# Patient Record
Sex: MALE | Race: WHITE | NOT HISPANIC OR LATINO | Employment: FULL TIME | URBAN - NONMETROPOLITAN AREA
[De-identification: names, ages, dates, MRNs, and addresses within clinical notes are randomized per-mention and may not be internally consistent; named-entity substitution may affect disease eponyms.]

---

## 2020-04-02 ENCOUNTER — OFFICE VISIT (OUTPATIENT)
Dept: FAMILY MEDICINE | Facility: OTHER | Age: 35
End: 2020-04-02
Attending: FAMILY MEDICINE

## 2020-04-02 VITALS
HEART RATE: 100 BPM | WEIGHT: 196.2 LBS | BODY MASS INDEX: 26 KG/M2 | OXYGEN SATURATION: 97 % | SYSTOLIC BLOOD PRESSURE: 126 MMHG | DIASTOLIC BLOOD PRESSURE: 80 MMHG | RESPIRATION RATE: 16 BRPM | TEMPERATURE: 98.1 F | HEIGHT: 73 IN

## 2020-04-02 DIAGNOSIS — B37.9 CANDIDA INFECTION: ICD-10-CM

## 2020-04-02 DIAGNOSIS — L23.9 ALLERGIC CONTACT DERMATITIS, UNSPECIFIED TRIGGER: Primary | ICD-10-CM

## 2020-04-02 PROBLEM — E66.3 OVERWEIGHT (BMI 25.0-29.9): Status: ACTIVE | Noted: 2018-08-29

## 2020-04-02 PROBLEM — Z72.0 TOBACCO ABUSE: Status: ACTIVE | Noted: 2018-08-29

## 2020-04-02 PROCEDURE — 99202 OFFICE O/P NEW SF 15 MIN: CPT | Performed by: FAMILY MEDICINE

## 2020-04-02 RX ORDER — TRIAMCINOLONE ACETONIDE 5 MG/G
OINTMENT TOPICAL
Qty: 45 G | Refills: 3 | Status: SHIPPED | OUTPATIENT
Start: 2020-04-02

## 2020-04-02 RX ORDER — KETOCONAZOLE 20 MG/G
CREAM TOPICAL 2 TIMES DAILY
Qty: 30 G | Refills: 3 | Status: SHIPPED | OUTPATIENT
Start: 2020-04-02

## 2020-04-02 RX ORDER — KETOCONAZOLE 20 MG/G
CREAM TOPICAL
COMMUNITY
Start: 2020-03-23 | End: 2020-04-02

## 2020-04-02 RX ORDER — PREDNISONE 10 MG/1
10 TABLET ORAL 2 TIMES DAILY
Qty: 14 TABLET | Refills: 0 | Status: SHIPPED | OUTPATIENT
Start: 2020-04-02

## 2020-04-02 ASSESSMENT — PAIN SCALES - GENERAL: PAINLEVEL: NO PAIN (0)

## 2020-04-02 ASSESSMENT — MIFFLIN-ST. JEOR: SCORE: 1870.9

## 2020-04-02 NOTE — NURSING NOTE
"Chief Complaint   Patient presents with     Rectal Problem     yeast infection     Has been doctoring over the phone at [facility] they put him on Diflucan and Nizoral.  Increased yellow discharge x 3 weeks.      Initial /80   Pulse 100   Temp 98.1  F (36.7  C) (Temporal)   Resp 16   Ht 1.842 m (6' 0.5\")   Wt 89 kg (196 lb 3.2 oz)   SpO2 97%   BMI 26.24 kg/m   Estimated body mass index is 26.24 kg/m  as calculated from the following:    Height as of this encounter: 1.842 m (6' 0.5\").    Weight as of this encounter: 89 kg (196 lb 3.2 oz).    Medication Reconciliation: complete      Norma J. Gosselin, LPN  "

## 2020-04-02 NOTE — PROGRESS NOTES
"Nursing Notes:   Gosselin, Norma J., LPN  4/2/2020  4:54 PM  Sign at exiting of workspace  Chief Complaint   Patient presents with     Rectal Problem     yeast infection     Has been doctoring over the phone at [facility] they put him on Diflucan and Nizoral.  Increased yellow discharge x 3 weeks.      Initial /80   Pulse 100   Temp 98.1  F (36.7  C) (Temporal)   Resp 16   Ht 1.842 m (6' 0.5\")   Wt 89 kg (196 lb 3.2 oz)   SpO2 97%   BMI 26.24 kg/m   Estimated body mass index is 26.24 kg/m  as calculated from the following:    Height as of this encounter: 1.842 m (6' 0.5\").    Weight as of this encounter: 89 kg (196 lb 3.2 oz).    Medication Reconciliation: complete      Norma J. Gosselin, LPN    SUBJECTIVE:   Ritchie Altman is a 34 year old male who has had about 3 weeks of itching in perineal to rectal area. Tried vaseline and cortisone and it helped. Then noted a rash that radiated to skin of \"butt cheeks\". Then also tried a jock itch spray.  Noted the area felt \"damp\" and rectal area had a yellowish discharge. No change in bowel movements. Last week called his clinic and they thought yeast and given nizoral ointment and oral diflucan, then several days later called again and repeated diflucan on 3/26/2020. Last 2 days has had continued \"fluid secretions\" and notes an odor and has place toilet paper in the area to absorb it.  He also then relates using a preparation H product in addition to those prescribed and used last night.     OBJECTIVE:   Vital signs:  Temp: 98.1  F (36.7  C) Temp src: Temporal BP: 126/80 Pulse: 100   Resp: 16 SpO2: 97 %     Height: 184.2 cm (6' 0.5\") Weight: 89 kg (196 lb 3.2 oz)  Estimated body mass index is 26.24 kg/m  as calculated from the following:    Height as of this encounter: 1.842 m (6' 0.5\").    Weight as of this encounter: 89 kg (196 lb 3.2 oz).  Appears well, alert, oriented, pleasant and cooperative.   Area of perineum and extending out from rectum at least 6 cm is a " confluent papulo vesicular rash that looks like a contact dermatitis. May have initially had yeast but now reacting to something applied.       ASSESSMENT:  1. Allergic contact dermatitis, unspecified trigger    2. Candida infection          PLAN:   STOP all over the counter topicals.  Sitz baths, avoid soap. Use panti liner to absorb secretions and keep dry.  Switch to mixture of steroid ointment with the ketaconazole as discussed. He is to mix 1:1 and apply.   Short burst of oral steroid.  Follow up if not improving and this will slowly fade and clear.  Michelle Ulloa MD  5:23 PM 4/2/2020   \

## 2020-05-14 ENCOUNTER — HOSPITAL ENCOUNTER (EMERGENCY)
Facility: OTHER | Age: 35
Discharge: HOME | End: 2020-05-14
Admitting: FAMILY MEDICINE
Payer: COMMERCIAL

## 2020-05-14 VITALS
RESPIRATION RATE: 18 BRPM | HEART RATE: 99 BPM | SYSTOLIC BLOOD PRESSURE: 143 MMHG | TEMPERATURE: 97.1 F | DIASTOLIC BLOOD PRESSURE: 73 MMHG | BODY MASS INDEX: 26.22 KG/M2 | WEIGHT: 196 LBS

## 2020-05-14 DIAGNOSIS — A49.01 INFECTION DUE TO STAPHYLOCOCCUS AUREUS: ICD-10-CM

## 2020-05-14 LAB
ALBUMIN UR-MCNC: NEGATIVE MG/DL
APPEARANCE UR: CLEAR
BILIRUB UR QL STRIP: NEGATIVE
COLOR UR AUTO: ABNORMAL
GLUCOSE UR STRIP-MCNC: NEGATIVE MG/DL
HGB UR QL STRIP: NEGATIVE
KETONES UR STRIP-MCNC: NEGATIVE MG/DL
LEUKOCYTE ESTERASE UR QL STRIP: NEGATIVE
NITRATE UR QL: NEGATIVE
PH UR STRIP: 7.5 PH (ref 5–7)
SOURCE: ABNORMAL
SP GR UR STRIP: 1.02 (ref 1–1.03)
UROBILINOGEN UR STRIP-MCNC: NORMAL MG/DL (ref 0–2)

## 2020-05-14 PROCEDURE — 25000132 ZZH RX MED GY IP 250 OP 250 PS 637: Performed by: FAMILY MEDICINE

## 2020-05-14 PROCEDURE — 99283 EMERGENCY DEPT VISIT LOW MDM: CPT | Performed by: FAMILY MEDICINE

## 2020-05-14 PROCEDURE — 81003 URINALYSIS AUTO W/O SCOPE: CPT | Performed by: FAMILY MEDICINE

## 2020-05-14 PROCEDURE — 99283 EMERGENCY DEPT VISIT LOW MDM: CPT | Mod: Z6 | Performed by: FAMILY MEDICINE

## 2020-05-14 RX ORDER — SULFAMETHOXAZOLE/TRIMETHOPRIM 800-160 MG
1 TABLET ORAL ONCE
Status: COMPLETED | OUTPATIENT
Start: 2020-05-14 | End: 2020-05-14

## 2020-05-14 RX ORDER — SULFAMETHOXAZOLE/TRIMETHOPRIM 800-160 MG
1 TABLET ORAL 2 TIMES DAILY
Qty: 20 TABLET | Refills: 0 | Status: SHIPPED | OUTPATIENT
Start: 2020-05-14 | End: 2020-05-24

## 2020-05-14 RX ADMIN — SULFAMETHOXAZOLE AND TRIMETHOPRIM 1 TABLET: 800; 160 TABLET ORAL at 05:24

## 2020-05-14 NOTE — ED AVS SNAPSHOT
[facility]  [address]  [place] [address]  Phone:  [phone]  Fax:  [phone]                                    Ritchie Altman   MRN: [id number]    Department:  [facility]   Date of Visit:  5/14/2020           After Visit Summary Signature Page    I have received my discharge instructions, and my questions have been answered. I have discussed any challenges I see with this plan with the nurse or doctor.    ..........................................................................................................................................  Patient/Patient Representative Signature      ..........................................................................................................................................  Patient Representative Print Name and Relationship to Patient    ..................................................               ................................................  Date                                   Time    ..........................................................................................................................................  Reviewed by Signature/Title    ...................................................              ..............................................  Date                                               Time          22EPIC Rev 08/18

## 2020-05-14 NOTE — ED TRIAGE NOTES
"Patient presents to the ED with complaints of abdominal pain, fatigue, and a reoccuring yeast infection.  Patient states for 1.5 months he has been having bouts of yeast infections which will flare up and patient has symptoms relief for 2 weeks after which it appears again.  Patient states [facility] and [facility].  Patient states yeast has appeared in groin, belly button, and buttocks.  Came back last Monday and is now causing bloating and gas.  Patient states while he was at work tonight he felt like he started urinating more and \"feels dehydrated because I started feeling tingly from head to toe\".  Patient also states, \"I dont know if its from the dehydration but im feeling really tired\".  Patient states no problem with eating or drinking.  Patient also states he has incorporated yogurt into his diet as he has \"been reading up on yeast infections and read that yogurt has good bacteria so I was hoping that would help\".     "

## 2020-05-14 NOTE — ED PROVIDER NOTES
History     Chief Complaint   Patient presents with     Abdominal Pain     Dysuria     HPI  Ritchie Altman is a 34 year old male who presents with concern related to recurrent yeast infection Patient was trying various otc remedies and then developed contact dermatitis. Was doctoring via virtual visits.  INitially treated with diflucan , and topical ketoconazole  Then seen for allergic dermatits at which time was treated with 7 days of prednisone . . Rash initially in rectal area and then spread to umbilicus and now in rectal area again  He thinks there is also a foul odor. It is somewhat itchy . He is also having some lower abdominal bloating and discomfort . NO fever , chills.     Allergies:  Allergies   Allergen Reactions     Penicillins Rash     Other reaction(s): *Unknown  As a child         Problem List:    Patient Active Problem List    Diagnosis Date Noted     Overweight (BMI 25.0-29.9) 08/29/2018     Priority: Medium     Tobacco abuse 08/29/2018     Priority: Medium        Past Medical History:    Past Medical History:   Diagnosis Date     General medical examination      Hemorrhage of rectum and anus      Major depressive disorder, single episode        Past Surgical History:    History reviewed. No pertinent surgical history.    Family History:    Family History   Problem Relation Age of Onset     Genetic Disorder Other         Genetic,General hx:  Migraines, ulcer disease, depression, fibromyalgia       Social History:  Marital Status:  Single [1]  Social History     Tobacco Use     Smoking status: Current Every Day Smoker     Packs/day: 0.50     Years: 18.00     Pack years: 9.00     Types: Cigarettes     Smokeless tobacco: Never Used   Substance Use Topics     Alcohol use: Yes     Comment: rare     Drug use: Never        Medications:    ketoconazole (NIZORAL) 2 % external cream  predniSONE (DELTASONE) 10 MG tablet  triamcinolone (KENALOG) 0.5 % external ointment          Review of Systems    Constitutional: Negative.    HENT: Negative.    Cardiovascular: Negative.    Gastrointestinal: Positive for abdominal distention and abdominal pain. Negative for blood in stool, constipation, diarrhea, nausea, rectal pain and vomiting.   Genitourinary: Positive for dysuria. Negative for discharge, genital sores, hematuria, penile pain, penile swelling, scrotal swelling, testicular pain and urgency.   Skin: Positive for rash.   Neurological: Negative.    Psychiatric/Behavioral: Negative.        Physical Exam   BP: (!) 143/73  Pulse: 99  Temp: 97.1  F (36.2  C)  Resp: 18  Weight: 88.9 kg (196 lb)      Physical Exam  Vitals signs and nursing note reviewed.   Constitutional:       Appearance: He is well-developed.   HENT:      Head: Normocephalic and atraumatic.   Abdominal:      General: Abdomen is flat. There is no distension.      Palpations: Abdomen is soft.   Skin:     Comments: Follicular rash perirectal and scattered down bilateral thighs with surrounding erythema consistent with staph infection    Neurological:      Mental Status: He is alert.         ED Course        Procedures          Patient presents to ER for evaluation of recurrent rash for the last month . Patient triaged to exam room . Vital signs reviewed. Medical records reviewed. History and exam completed. Appearance of rash most consistent with staph folliculitis. Discussed diagnosis and management with patient . Will  Treat with bactrim . Follow up with primary care if rash is not starting to improve in the next several days or if rash worsens or if he should develop other symptoms such as fever, chills or other systemic symptoms          Results for orders placed or performed during the hospital encounter of 05/14/20 (from the past 24 hour(s))   UA reflex to Microscopic and Culture    Specimen: Urine clean catch; Midstream Urine   Result Value Ref Range    Color Urine Light Yellow     Appearance Urine Clear     Glucose Urine Negative  NEG^Negative mg/dL    Bilirubin Urine Negative NEG^Negative    Ketones Urine Negative NEG^Negative mg/dL    Specific Gravity Urine 1.018 1.003 - 1.035    Blood Urine Negative NEG^Negative    pH Urine 7.5 (H) 5.0 - 7.0 pH    Protein Albumin Urine Negative NEG^Negative mg/dL    Urobilinogen mg/dL Normal 0.0 - 2.0 mg/dL    Nitrite Urine Negative NEG^Negative    Leukocyte Esterase Urine Negative NEG^Negative    Source Midstream Urine        Medications - No data to display    Assessments & Plan (with Medical Decision Making)     I have reviewed the nursing notes.    I have reviewed the findings, diagnosis, plan and need for follow up with the patient.      New Prescriptions    No medications on file       Final diagnoses:   Infection due to Staphylococcus aureus       5/14/2020   [facility]arisa Doyle MD  05/17/20 0902

## 2020-05-17 ASSESSMENT — ENCOUNTER SYMPTOMS
NAUSEA: 0
VOMITING: 0
RECTAL PAIN: 0
PSYCHIATRIC NEGATIVE: 1
NEUROLOGICAL NEGATIVE: 1
HEMATURIA: 0
CONSTIPATION: 0
BLOOD IN STOOL: 0
CONSTITUTIONAL NEGATIVE: 1
DIARRHEA: 0
ABDOMINAL PAIN: 1
DYSURIA: 1
CARDIOVASCULAR NEGATIVE: 1
ABDOMINAL DISTENTION: 1

## 2020-05-21 ENCOUNTER — HOSPITAL ENCOUNTER (EMERGENCY)
Facility: OTHER | Age: 35
Discharge: HOME | End: 2020-05-21
Attending: PHYSICIAN ASSISTANT | Admitting: PHYSICIAN ASSISTANT
Payer: COMMERCIAL

## 2020-05-21 ENCOUNTER — APPOINTMENT (OUTPATIENT)
Dept: CT IMAGING | Facility: OTHER | Age: 35
End: 2020-05-21
Attending: PHYSICIAN ASSISTANT
Payer: COMMERCIAL

## 2020-05-21 VITALS
OXYGEN SATURATION: 99 % | DIASTOLIC BLOOD PRESSURE: 61 MMHG | BODY MASS INDEX: 25.41 KG/M2 | WEIGHT: 190 LBS | SYSTOLIC BLOOD PRESSURE: 113 MMHG | HEART RATE: 70 BPM | RESPIRATION RATE: 14 BRPM | TEMPERATURE: 97.4 F

## 2020-05-21 DIAGNOSIS — R10.84 ABDOMINAL PAIN, GENERALIZED: ICD-10-CM

## 2020-05-21 LAB
ALBUMIN SERPL-MCNC: 4.7 G/DL (ref 3.5–5.7)
ALBUMIN UR-MCNC: NEGATIVE MG/DL
ALP SERPL-CCNC: 54 U/L (ref 34–104)
ALT SERPL W P-5'-P-CCNC: 24 U/L (ref 7–52)
ANION GAP SERPL CALCULATED.3IONS-SCNC: 9 MMOL/L (ref 3–14)
APPEARANCE UR: CLEAR
AST SERPL W P-5'-P-CCNC: 30 U/L (ref 13–39)
BASOPHILS # BLD AUTO: 0.1 10E9/L (ref 0–0.2)
BASOPHILS NFR BLD AUTO: 1.2 %
BILIRUB SERPL-MCNC: 0.9 MG/DL (ref 0.3–1)
BILIRUB UR QL STRIP: NEGATIVE
BUN SERPL-MCNC: 16 MG/DL (ref 7–25)
CALCIUM SERPL-MCNC: 9.5 MG/DL (ref 8.6–10.3)
CHLORIDE SERPL-SCNC: 103 MMOL/L (ref 98–107)
CO2 SERPL-SCNC: 26 MMOL/L (ref 21–31)
COLOR UR AUTO: YELLOW
CREAT SERPL-MCNC: 1.07 MG/DL (ref 0.7–1.3)
DIFFERENTIAL METHOD BLD: NORMAL
EOSINOPHIL # BLD AUTO: 0 10E9/L (ref 0–0.7)
EOSINOPHIL NFR BLD AUTO: 0.7 %
ERYTHROCYTE [DISTWIDTH] IN BLOOD BY AUTOMATED COUNT: 11.8 % (ref 10–15)
GFR SERPL CREATININE-BSD FRML MDRD: 79 ML/MIN/{1.73_M2}
GLUCOSE SERPL-MCNC: 97 MG/DL (ref 70–105)
GLUCOSE UR STRIP-MCNC: NEGATIVE MG/DL
HCT VFR BLD AUTO: 44.2 % (ref 40–53)
HGB BLD-MCNC: 15 G/DL (ref 13.3–17.7)
HGB UR QL STRIP: NEGATIVE
IMM GRANULOCYTES # BLD: 0 10E9/L (ref 0–0.4)
IMM GRANULOCYTES NFR BLD: 0.2 %
KETONES UR STRIP-MCNC: NEGATIVE MG/DL
LEUKOCYTE ESTERASE UR QL STRIP: NEGATIVE
LIPASE SERPL-CCNC: 25 U/L (ref 11–82)
LYMPHOCYTES # BLD AUTO: 0.8 10E9/L (ref 0.8–5.3)
LYMPHOCYTES NFR BLD AUTO: 18.1 %
MCH RBC QN AUTO: 31.3 PG (ref 26.5–33)
MCHC RBC AUTO-ENTMCNC: 33.9 G/DL (ref 31.5–36.5)
MCV RBC AUTO: 92 FL (ref 78–100)
MONOCYTES # BLD AUTO: 0.8 10E9/L (ref 0–1.3)
MONOCYTES NFR BLD AUTO: 18.1 %
NEUTROPHILS # BLD AUTO: 2.7 10E9/L (ref 1.6–8.3)
NEUTROPHILS NFR BLD AUTO: 61.7 %
NITRATE UR QL: NEGATIVE
PH UR STRIP: 5.5 PH (ref 5–7)
PLATELET # BLD AUTO: 275 10E9/L (ref 150–450)
POTASSIUM SERPL-SCNC: 3.8 MMOL/L (ref 3.5–5.1)
PROT SERPL-MCNC: 7.5 G/DL (ref 6.4–8.9)
RBC # BLD AUTO: 4.79 10E12/L (ref 4.4–5.9)
SODIUM SERPL-SCNC: 138 MMOL/L (ref 134–144)
SOURCE: NORMAL
SP GR UR STRIP: 1.01 (ref 1–1.03)
UROBILINOGEN UR STRIP-MCNC: NORMAL MG/DL (ref 0–2)
WBC # BLD AUTO: 4.3 10E9/L (ref 4–11)

## 2020-05-21 PROCEDURE — 80053 COMPREHEN METABOLIC PANEL: CPT | Performed by: PHYSICIAN ASSISTANT

## 2020-05-21 PROCEDURE — 83690 ASSAY OF LIPASE: CPT | Performed by: PHYSICIAN ASSISTANT

## 2020-05-21 PROCEDURE — 99284 EMERGENCY DEPT VISIT MOD MDM: CPT | Mod: 25 | Performed by: PHYSICIAN ASSISTANT

## 2020-05-21 PROCEDURE — 74176 CT ABD & PELVIS W/O CONTRAST: CPT

## 2020-05-21 PROCEDURE — 85025 COMPLETE CBC W/AUTO DIFF WBC: CPT | Performed by: PHYSICIAN ASSISTANT

## 2020-05-21 PROCEDURE — 99283 EMERGENCY DEPT VISIT LOW MDM: CPT | Mod: Z6 | Performed by: PHYSICIAN ASSISTANT

## 2020-05-21 PROCEDURE — 81003 URINALYSIS AUTO W/O SCOPE: CPT | Performed by: PHYSICIAN ASSISTANT

## 2020-05-21 PROCEDURE — 36415 COLL VENOUS BLD VENIPUNCTURE: CPT | Performed by: PHYSICIAN ASSISTANT

## 2020-05-21 RX ORDER — SUCRALFATE ORAL 1 G/10ML
1 SUSPENSION ORAL 4 TIMES DAILY
Qty: 400 ML | Refills: 0 | Status: SHIPPED | OUTPATIENT
Start: 2020-05-21 | End: 2020-05-31

## 2020-05-21 NOTE — DISCHARGE INSTRUCTIONS
FOLLOW UP IN 8-12 HOURS FOR A RECHECK   PLEASE RETURN SOONER IF YOU HAVE FURTHER CONCERNS  TAKE ALL PREVIOUS AND ANY NEW MEDS AS PRESCRIBED       THE DISCHARGE INSTRUCTIONS ARE INTENDED AS A COMPLEMENT TO AND NOT A REPLACEMENT FOR THE VERBAL INSTRUCTIONS THAT I HAVE PROVIDED YOU TONIGHT. AFTER GOING OVER THE PLAN OF CARE TONIGHT, INCLUDING SIDE EFFECTS, ADVERSE REACTIONS OF ALL MEDICATIONS PRESCRIBED (THIS WILL BE PROVIDED TO YOU AT THE PHARMACY ALSO) AND PROVIDING YOU WITH THE VERBAL INSTRUCTIONS AT DISCHARGE YOU HAVE HAD THE OPPORTUNITY TO ASK FURTHER QUESTIONS AND TO CLARIFY UNCERTAINTIES. SINCE YOU HAVE NO FURTHER QUESTIONS PLEASE HAVE A WONDERFUL SAFE EVENING THANK YOU.     Blood Pressure   ________________________________________________________________________  KEY POINTS  Blood pressure is the force of blood against artery walls as the heart pumps blood through the body.  Most people with high blood pressure have no symptoms.  If your blood pressure is too high, your healthcare provider may advise that you lose excess weight, use less salt in your food, be physically active, or take medicine.  If you have low blood pressure that is causing symptoms, do not skip meals, drink plenty of liquids, and stand up slowly after laying down.  ________________________________________________________________________  What is blood pressure?  Blood pressure is the force of blood against artery walls as the heart pumps blood through the body. Many people can improve their blood pressure or prevent high blood pressure with diet changes, more activity, and weight loose if needed. Even if you have a strong family history of high blood pressure, you can improve your blood pressure with a healthy lifestyle.  Blood pressure can go up briefly with exercise, stress, pain, or strong emotions. Drinking alcohol or using some illegal drugs, like cocaine, can also raise blood pressure.  Blood pressure normally goes down when you are  "resting, sleeping, or feeling calm and relaxed. It can also go down if you are dehydrated and are not drinking enough liquids, such as if you are working or exercising in the hot sun.  How is blood pressure measured?  Most people with high blood pressure have no symptoms. The only way to find out if your blood pressure is normal, too high, or too low is to have it measured. Your healthcare provider measures blood pressure using an inflatable cuff around your upper arm and either a stethoscope or a machine that shows the result.  Low blood pressure usually means blood pressure that is lower than 90/60 or is low enough to cause symptoms. The first, upper number (90 in this example) is the pressure when the heart beats and pushes blood out to the rest of the body. The second, lower number (60 in this example) is the pressure when the heart rests between beats. Low blood pressure is less common than high blood pressure.  Normal resting blood pressure ranges up to 120/80 ( 120 over 80\").  Borderline high blood pressure is 120/80 or higher but less than 140/90.  High blood pressure is 140/90 or higher for most people. If you have chronic kidney disease, 130/80 or higher is considered high blood pressure.  Why is high blood pressure a problem?  Over time, if your blood pressure rises and stays high, it can damage your blood vessels, heart, brain, kidneys, and eyes even though you may have no symptoms. The higher your blood pressure is, the more it increases your risk of heart attack, stroke, and other serious medical problems.  If you have high blood pressure, lowering it and keeping it normal can help prevent a heart attack or stroke. Keeping your blood pressure under control can help prevent long-term health problems as well, such as heart failure, kidney failure, and blindness.  How can I keep my blood pressure at the right level?  If your blood pressure is too high, your provider may recommend lifestyle changes to help " you lower your blood pressure, such as:  Lose excess weight. If you are overweight, losing even 10 pounds can lower your blood pressure.  Use less salt (sodium) in your food. Check the levels of sodium listed on food labels. Most of the sodium you eat may be hidden in processed foods such as chips, crackers, canned or boxed foods, fast food, or restaurant food.  Follow a healthy eating plan that is low in saturated fat, cholesterol, and processed foods. Include lots of fruits, vegetables, and fat-free or low-fat milk and milk products. Eat only enough calories to reach or keep a healthy weight. Ask your healthcare provider about how many calories you should eat each day.  Be physically active. Your provider can give you a physical activity plan that tells you what kind of activity and how much is safe for you.  Find ways to relax and to manage stress.  If you smoke, try to quit. Talk to your healthcare provider about ways to quit smoking. Smoking with high blood pressure raises the risk of heart attack and stroke.  If you want to drink alcohol, ask your healthcare provider how much is safe for you to drink.  Be careful with nonprescription medicines or herbal supplements. Some can raise blood pressure. This includes diet pills, cold and pain medicines, and energy drinks. Read labels or ask your pharmacist if the medicine or supplement affects blood pressure.  If lifestyle changes don t lower your blood pressure enough, your healthcare provider may prescribe one or more types of blood pressure medicine. Always follow your healthcare provider's instructions for taking medicine. Don't take more or less medicine or stop taking a medicine without talking to your provider first. It can be dangerous to stop taking certain blood pressure medicines suddenly.  If you have low blood pressure that is causing symptoms, after your healthcare provider has seen you, try these tips:  Don t skip meals. Eat a healthy diet.  Avoid  being out in the heat for a long time or being too active without drinking enough liquids.  Drink plenty of liquids every day, especially in hot weather or when outside.  If you have been lying down, sit for a moment before standing up, and then stand up slowly. Stand a moment before walking. Walk in place briefly while pulling in your stomach muscles several times. (This helps the return of blood flow from the legs.)  If your blood pressure is normal, check it at least once a year. Your provider may recommend checking your blood pressure at home between checkups.  Developed by [employer].  Adult Advisor 2016.2 published by [employer].  Last modified: 2016-04-21  Last reviewed: 2016-04-15  This content is reviewed periodically and is subject to change as new health information becomes available. The information is intended to inform and educate and is not a replacement for medical evaluation, advice, diagnosis or treatment by a healthcare professional.  References   Adult Advisor 2016.2 Index    Copyright   2016 [employer], a division of [employer] All rights reserved.

## 2020-05-21 NOTE — ED TRIAGE NOTES
Last night at work feeling fine, suddenly feeling sick, abdominal pain,clammy, tingling feeling in stomach, radiating to right side. Currently on an antibiotic for staph infection in rectal area, and yeast infection, started the last week in March for rectal.

## 2020-05-21 NOTE — ED PROVIDER NOTES
"  History     Chief Complaint   Patient presents with     Abdominal Pain     HPI  Ritchie Altman is a 34 year old male who presents [facility] this evening for evaluation of some abdominal pain.  Is been ongoing since early this morning.  Nausea without vomiting.  Is taking antibiotics for a \"staph infection\" he does not have any diarrhea constipation no loss of bowel bladder function he does not have any headaches number disturbances cough no nausea vomiting.  Which is discomfort 4 out of 10.    Allergies:  Allergies   Allergen Reactions     Penicillins Rash     Other reaction(s): *Unknown  As a child         Problem List:    Patient Active Problem List    Diagnosis Date Noted     Overweight (BMI 25.0-29.9) 08/29/2018     Priority: Medium     Tobacco abuse 08/29/2018     Priority: Medium        Past Medical History:    Past Medical History:   Diagnosis Date     General medical examination      Hemorrhage of rectum and anus      Major depressive disorder, single episode        Past Surgical History:    History reviewed. No pertinent surgical history.    Family History:    Family History   Problem Relation Age of Onset     Genetic Disorder Other         Genetic,General hx:  Migraines, ulcer disease, depression, fibromyalgia       Social History:  Marital Status:  Single [1]  Social History     Tobacco Use     Smoking status: Current Every Day Smoker     Packs/day: 0.50     Years: 18.00     Pack years: 9.00     Types: Cigarettes     Smokeless tobacco: Never Used   Substance Use Topics     Alcohol use: Yes     Comment: rare     Drug use: Never        Medications:    ketoconazole (NIZORAL) 2 % external cream  omeprazole (PRILOSEC) 20 MG DR capsule  sucralfate (CARAFATE) 1 GM/10ML suspension  sulfamethoxazole-trimethoprim (BACTRIM DS) 800-160 MG tablet  predniSONE (DELTASONE) 10 MG tablet  triamcinolone (KENALOG) 0.5 % external ointment          Review of Systems     Pertinent positives and negatives are as above in " the HPI. 10 point review of systems is otherwise negative.      Physical Exam   BP: (!) 141/70  Pulse: 102  Temp: 98.7  F (37.1  C)  Weight: 86.2 kg (190 lb)  SpO2: 96 %      Physical Exam   Exam:  Constitutional: healthy, alert and no distress  Head: Normocephalic. No masses, lesions, tenderness or abnormalities  Neck: Neck supple. No adenopathy. Thyroid symmetric, normal size,, Carotids without bruits.  ENT: ENT exam normal, no neck nodes or sinus tenderness  Cardiovascular: negative, PMI normal. No lifts, heaves, or thrills. RRR. No murmurs, clicks gallops or rub  Respiratory: negative, Percussion normal. Good diaphragmatic excursion. Lungs clear  Gastrointestinal: Abdomen soft, non-tender. BS normal. No masses, organomegaly, Rosing sign is negative.  Psoas sign negative  : Deferred  Musculoskeletal: extremities normal- no gross deformities noted, gait normal and normal muscle tone  Skin: no suspicious lesions or rashes  Neurologic: Gait normal. Reflexes normal and symmetric. Sensation grossly WNL.  Psychiatric: mentation appears normal and affect normal/bright  Hematologic/Lymphatic/Immunologic: Normal cervical lymph nodes      ED Course        Procedures              Results for orders placed or performed during the hospital encounter of 05/21/20 (from the past 24 hour(s))   CBC with platelets differential   Result Value Ref Range    WBC 4.3 4.0 - 11.0 10e9/L    RBC Count 4.79 4.4 - 5.9 10e12/L    Hemoglobin 15.0 13.3 - 17.7 g/dL    Hematocrit 44.2 40.0 - 53.0 %    MCV 92 78 - 100 fl    MCH 31.3 26.5 - 33.0 pg    MCHC 33.9 31.5 - 36.5 g/dL    RDW 11.8 10.0 - 15.0 %    Platelet Count 275 150 - 450 10e9/L    Diff Method Automated Method     % Neutrophils 61.7 %    % Lymphocytes 18.1 %    % Monocytes 18.1 %    % Eosinophils 0.7 %    % Basophils 1.2 %    % Immature Granulocytes 0.2 %    Absolute Neutrophil 2.7 1.6 - 8.3 10e9/L    Absolute Lymphocytes 0.8 0.8 - 5.3 10e9/L    Absolute Monocytes 0.8 0.0 - 1.3 10e9/L     Absolute Eosinophils 0.0 0.0 - 0.7 10e9/L    Absolute Basophils 0.1 0.0 - 0.2 10e9/L    Abs Immature Granulocytes 0.0 0 - 0.4 10e9/L   Comprehensive metabolic panel   Result Value Ref Range    Sodium 138 134 - 144 mmol/L    Potassium 3.8 3.5 - 5.1 mmol/L    Chloride 103 98 - 107 mmol/L    Carbon Dioxide 26 21 - 31 mmol/L    Anion Gap 9 3 - 14 mmol/L    Glucose 97 70 - 105 mg/dL    Urea Nitrogen 16 7 - 25 mg/dL    Creatinine 1.07 0.70 - 1.30 mg/dL    GFR Estimate 79 >60 mL/min/[1.73_m2]    GFR Estimate If Black >90 >60 mL/min/[1.73_m2]    Calcium 9.5 8.6 - 10.3 mg/dL    Bilirubin Total 0.9 0.3 - 1.0 mg/dL    Albumin 4.7 3.5 - 5.7 g/dL    Protein Total 7.5 6.4 - 8.9 g/dL    Alkaline Phosphatase 54 34 - 104 U/L    ALT 24 7 - 52 U/L    AST 30 13 - 39 U/L   Lipase   Result Value Ref Range    Lipase 25 11 - 82 U/L   UA reflex to Microscopic and Culture    Specimen: Urine clean catch; Midstream Urine   Result Value Ref Range    Color Urine Yellow     Appearance Urine Clear     Glucose Urine Negative NEG^Negative mg/dL    Bilirubin Urine Negative NEG^Negative    Ketones Urine Negative NEG^Negative mg/dL    Specific Gravity Urine 1.008 1.003 - 1.035    Blood Urine Negative NEG^Negative    pH Urine 5.5 5.0 - 7.0 pH    Protein Albumin Urine Negative NEG^Negative mg/dL    Urobilinogen mg/dL Normal 0.0 - 2.0 mg/dL    Nitrite Urine Negative NEG^Negative    Leukocyte Esterase Urine Negative NEG^Negative    Source Midstream Urine    CT Abdomen Pelvis w/o Contrast    Narrative    EXAMINATION: CT ABDOMEN PELVIS W/O CONTRAST, 5/21/2020 3:37 PM    TECHNIQUE:  Helical CT images from the lung bases through the  symphysis pubis were obtained  with IV contrast. Contrast dose:    COMPARISON: none    HISTORY: pain right flank    FINDINGS:    There is dependent atelectasis at the lung bases.    The liver is free of masses or biliary ductal enlargement. No  calcified gallstones are seen.    The the spleen and pancreas appear normal.    The  adrenal glands are normal.    The right and left kidneys are free of masses or hydronephrosis. There  are no renal or ureteric calculi.    The periaortic lymph nodes are normal in caliber.    No intraperitoneal masses or inflammatory changes are noted.    In the pelvis the bladder and rectum appear normal.    The regional skeleton is intact      Impression    IMPRESSION: Negative CT scan of the abdomen and pelvis     KASHIF CHAUDHARY MD       Medications - No data to display    Assessments & Plan (with Medical Decision Making)     I have reviewed the nursing notes.    I have reviewed the findings, diagnosis, plan and need for follow up with the patient.  Differential diagnosis at this point includes: appendicitis, aortic aneurysm, mesenteric ischemia, bowel perforation, bowel obstruction, cholecystitis, pancreatitis, hepatitis, gastritis, GERD, diverticulitis, PUD, pyelonephritis/UTI, renal colic/stone, testicular torsion or other acute scrotal process, inflammatory bowel diseases, AAA as well as other etiologies.  Patient presents respond to see me for evaluation laboratory studies are nonrevealing imaging nonrevealing at this time.  Sounds like he did take some antibiotics for his staph infection.  I will place him on some Carafate for at home.  I would encourage him close follow-up with his primary care physician.  If symptoms worsen if there is further concerns he should return to the emergency department.  Because of the abdominal cramping I would encourage him to follow-up in 8 to 12 hours for reevaluation.  I explained my diagnostic considerations and recommendations and the patient voiced an understanding and was in agreement with the treatment plan. All questions were answered. We discussed potential side effects of any prescribed or recommended therapies, as well as expectations for response to treatments.    New Prescriptions    SUCRALFATE (CARAFATE) 1 GM/10ML SUSPENSION    Take 10 mLs (1 g) by mouth 4  times daily for 10 days       Final diagnoses:   Abdominal pain, generalized       5/21/2020   [facility]     Kenny Thompson PA-C  05/21/20 [id number]

## 2020-12-27 ENCOUNTER — HEALTH MAINTENANCE LETTER (OUTPATIENT)
Age: 35
End: 2020-12-27

## 2021-10-09 ENCOUNTER — HEALTH MAINTENANCE LETTER (OUTPATIENT)
Age: 36
End: 2021-10-09

## 2022-01-29 ENCOUNTER — HEALTH MAINTENANCE LETTER (OUTPATIENT)
Age: 37
End: 2022-01-29

## 2022-09-17 ENCOUNTER — HEALTH MAINTENANCE LETTER (OUTPATIENT)
Age: 37
End: 2022-09-17

## 2023-05-06 ENCOUNTER — HEALTH MAINTENANCE LETTER (OUTPATIENT)
Age: 38
End: 2023-05-06

## (undated) RX ORDER — SULFAMETHOXAZOLE/TRIMETHOPRIM 800-160 MG
TABLET ORAL
Status: DISPENSED
Start: 2020-05-14